# Patient Record
Sex: MALE | Race: BLACK OR AFRICAN AMERICAN | NOT HISPANIC OR LATINO | URBAN - METROPOLITAN AREA
[De-identification: names, ages, dates, MRNs, and addresses within clinical notes are randomized per-mention and may not be internally consistent; named-entity substitution may affect disease eponyms.]

---

## 2018-03-22 ENCOUNTER — OFFICE (OUTPATIENT)
Dept: URBAN - METROPOLITAN AREA CLINIC 101 | Facility: CLINIC | Age: 73
End: 2018-03-22
Payer: COMMERCIAL

## 2018-03-22 VITALS
WEIGHT: 219 LBS | HEART RATE: 67 BPM | TEMPERATURE: 97.7 F | SYSTOLIC BLOOD PRESSURE: 137 MMHG | DIASTOLIC BLOOD PRESSURE: 76 MMHG | HEIGHT: 68 IN

## 2018-03-22 DIAGNOSIS — E78.4 OTHER HYPERLIPIDEMIA: ICD-10-CM

## 2018-03-22 DIAGNOSIS — R74.8 ABNORMAL LEVELS OF OTHER SERUM ENZYMES: ICD-10-CM

## 2018-03-22 LAB
COMPREHENSIVE METABOLIC PROF: ALANINE AMINOTRANS (ALT/GPT): 38 IU/L
COMPREHENSIVE METABOLIC PROF: ALBUMIN: 4 GM/DL
COMPREHENSIVE METABOLIC PROF: ALK PHOS,TOTAL: 292 IU/L — HIGH
COMPREHENSIVE METABOLIC PROF: ANION GAP: 8
COMPREHENSIVE METABOLIC PROF: AST/SGOT ASPARTATE AMINO TRANS: 24 U/L
COMPREHENSIVE METABOLIC PROF: BILIRUBIN,TOTAL: 0.5 MG/DL
COMPREHENSIVE METABOLIC PROF: BLOOD UREA NITROGEN: 14 MG/DL
COMPREHENSIVE METABOLIC PROF: CALCIUM LEVEL: 8.6 MG/DL
COMPREHENSIVE METABOLIC PROF: CARBON DIOXIDE: 29 MEQ/L
COMPREHENSIVE METABOLIC PROF: CHLORIDE LEVEL: 104 MEQ/L
COMPREHENSIVE METABOLIC PROF: CREATININE,SERUM: 1.07 MG/DL
COMPREHENSIVE METABOLIC PROF: GLOMERULAR FILTRATION RATE: > 60 ML/MIN
COMPREHENSIVE METABOLIC PROF: GLUCOSE,RANDOM: 96 MG/DL
COMPREHENSIVE METABOLIC PROF: POTASSIUM LEVEL: 3.7 MEQ/L
COMPREHENSIVE METABOLIC PROF: SODIUM LEVEL, SERUM: 141 MEQ/L
COMPREHENSIVE METABOLIC PROF: TOTAL PROTEIN: 7.8 GM/DL
FERRITIN: 89.9 NG/ML
IRON   TIBC: CALC TOTAL IRON BINDING CAP: 375 MCG/DL
IRON   TIBC: IRON (FE): 106 MCG/DL
IRON   TIBC: PERFORMING LOCATION: (no result)
IRON   TIBC: SATURATION %: 28.2 %
IRON   TIBC: TRANSFERRIN: 268 MG/DL
PROTHROMBIN TIME  PT/ INR: INR: 1.05
PROTHROMBIN TIME  PT/ INR: PROTHROMBIN TIME (PATIENT): 11.1 SECONDS
VIT D 25HYDROXY (D2+D3): 19.54 NG/ML — LOW

## 2018-03-22 PROCEDURE — 99204 OFFICE O/P NEW MOD 45 MIN: CPT

## 2018-03-22 NOTE — SERVICEHPINOTES
BEATRICE STATON   is a   73   year old male who is being seen in consultation at the request of   JIMMIE PUTNAM   for elevated alk phos. He denies any current GI complaints. Denies abdominal pain, pruritus, fatigue. He takes 2 stool softeners daily and has a BM once daily. No blood in the stool or diarrhea. No family hx of autoimmune conditions, colon cancer, or IBD. Last colonoscopy was in 2014 by Dr Hernandez, no polyps, recall 10 years. Colonoscopy prior to that in 2009, hyperplastic polyp. No weight loss, fever, or chills.2/14/18: alk phos: 307, bilirubin 0.5 (normal), AST/ALT: 30/62, normal CBC.BR

## 2018-03-24 LAB
ACTIN (SMOOTH MUSCLE) AB - REF: 6 UNITS
ALPHA-1-ANTITRYPSIN - REF: 147 MG/DL
ANA ANTINUCLEAR AB W/RFLX- REF: ANA ANTINUCLEAR ANTIBODY RES: NEGATIVE
ANA ANTINUCLEAR AB W/RFLX- REF: PERFORMING LOCATION: (no result)
CELIAC DISEASE COMP - REF: DEAMIDATED GLIADIN, IGA - REF: 2 UNITS
CELIAC DISEASE COMP - REF: DEAMIDATED GLIADIN, IGG - REF: 2 UNITS
CELIAC DISEASE COMP - REF: ENDOMYSIAL ANTIBODY IGA - REF: NEGATIVE
CELIAC DISEASE COMP - REF: IMMUNOGLOBULIN A, QN, SERUM: 234 MG/DL
CELIAC DISEASE COMP - REF: T-TRANSGLUTAMINASE IGA - REF: <2 U/ML
CELIAC DISEASE COMP - REF: T-TRANSGLUTAMINASE IGG - REF: 2 U/ML
CERULOPLASMIN - REF: 24 MG/DL
HBSAG SCREEN - REF: HBSAG SCREEN: NEGATIVE
HEP C VIRUS AB W/RFLX VER- REF: HCV AB COMMENT: NORMAL
HEP C VIRUS AB W/RFLX VER- REF: HEP C VIRUS AB: <0.1 S/CO RATIO
MITOCHONDRIAL (M2) AB - REF: 13.7 UNITS

## 2018-06-05 ENCOUNTER — INPATIENT HOSPITAL (OUTPATIENT)
Dept: URBAN - METROPOLITAN AREA HOSPITAL 32 | Facility: HOSPITAL | Age: 73
End: 2018-06-05
Payer: MEDICARE

## 2018-06-05 DIAGNOSIS — D62 ACUTE POSTHEMORRHAGIC ANEMIA: ICD-10-CM

## 2018-06-05 DIAGNOSIS — K92.2 GASTROINTESTINAL HEMORRHAGE, UNSPECIFIED: ICD-10-CM

## 2018-06-05 DIAGNOSIS — K92.1 MELENA: ICD-10-CM

## 2018-06-05 PROCEDURE — 99291 CRITICAL CARE FIRST HOUR: CPT

## 2018-06-06 ENCOUNTER — INPATIENT HOSPITAL (OUTPATIENT)
Dept: URBAN - METROPOLITAN AREA HOSPITAL 32 | Facility: HOSPITAL | Age: 73
End: 2018-06-06
Payer: MEDICARE

## 2018-06-06 DIAGNOSIS — D62 ACUTE POSTHEMORRHAGIC ANEMIA: ICD-10-CM

## 2018-06-06 DIAGNOSIS — K92.1 MELENA: ICD-10-CM

## 2018-06-06 DIAGNOSIS — K92.2 GASTROINTESTINAL HEMORRHAGE, UNSPECIFIED: ICD-10-CM

## 2018-06-06 PROCEDURE — 99232 SBSQ HOSP IP/OBS MODERATE 35: CPT

## 2018-06-07 PROCEDURE — 99232 SBSQ HOSP IP/OBS MODERATE 35: CPT

## 2018-06-26 ENCOUNTER — OFFICE (OUTPATIENT)
Dept: URBAN - METROPOLITAN AREA CLINIC 33 | Facility: CLINIC | Age: 73
End: 2018-06-26
Payer: MEDICARE

## 2018-06-26 VITALS
DIASTOLIC BLOOD PRESSURE: 78 MMHG | HEIGHT: 68 IN | HEART RATE: 71 BPM | TEMPERATURE: 97.5 F | WEIGHT: 218 LBS | SYSTOLIC BLOOD PRESSURE: 133 MMHG

## 2018-06-26 DIAGNOSIS — Z86.010 PERSONAL HISTORY OF COLONIC POLYPS: ICD-10-CM

## 2018-06-26 DIAGNOSIS — K92.2 GASTROINTESTINAL HEMORRHAGE, UNSPECIFIED: ICD-10-CM

## 2018-06-26 DIAGNOSIS — K57.30 DIVERTICULOSIS OF LARGE INTESTINE WITHOUT PERFORATION OR ABS: ICD-10-CM

## 2018-06-26 DIAGNOSIS — R93.5 ABNORMAL FINDINGS ON DIAGNOSTIC IMAGING OF OTHER ABDOMINAL R: ICD-10-CM

## 2018-06-26 PROCEDURE — 99214 OFFICE O/P EST MOD 30 MIN: CPT

## 2018-06-26 NOTE — SERVICEHPINOTES
Mr. Mckinney is a 73 yr old male here for follow up to recent hospitalization for a GI bleed. He presented to the ER on 6/5/18 for hematochezia. While in the ER, he passed a large, bloody stool and became bradycardiac, hypotensive and had a syncopal episode. He was hemodynamically unstable and was admitted to the ICU. His hemoglobin dropped to 10.8 and he was given 4 units of PRBCS. He subsequently had an IR guided embolization.   A CT scan during hospitalization showed focal wall thickening with mild irregularity involving the mid descending colon malignancy not excluded recommend colonoscopy. Since being d/c from the hospital, he is doing well. He notes regular, brown bowel movements and no further bleeding. He just had a CBC from his PCP at the end of last week and was told everything was "fine". No abdominal pain. He had a colonoscopy in 2009 by Dr. Guevara which showed a hyperplastic polyp. Colonoscopy in 2014 by Dr. Hernandez showed diverticulosis but was otherwise unremarkable. He takes a baby ASA but no other NSAID use. No family hx of GI illness. Loy known heart issues.

## 2018-08-15 ENCOUNTER — ON CAMPUS - OUTPATIENT (OUTPATIENT)
Dept: URBAN - METROPOLITAN AREA HOSPITAL 35 | Facility: HOSPITAL | Age: 73
End: 2018-08-15
Payer: COMMERCIAL

## 2018-08-15 DIAGNOSIS — R93.5 ABNORMAL FINDINGS ON DIAGNOSTIC IMAGING OF OTHER ABDOMINAL R: ICD-10-CM

## 2018-08-15 DIAGNOSIS — D12.0 BENIGN NEOPLASM OF CECUM: ICD-10-CM

## 2018-08-15 DIAGNOSIS — K92.2 GASTROINTESTINAL HEMORRHAGE, UNSPECIFIED: ICD-10-CM

## 2018-08-15 PROCEDURE — 45380 COLONOSCOPY AND BIOPSY: CPT

## 2022-04-18 ENCOUNTER — INPATIENT HOSPITAL (OUTPATIENT)
Dept: URBAN - METROPOLITAN AREA HOSPITAL 34 | Facility: HOSPITAL | Age: 77
End: 2022-04-18
Payer: COMMERCIAL

## 2022-04-18 DIAGNOSIS — K92.2 GASTROINTESTINAL HEMORRHAGE, UNSPECIFIED: ICD-10-CM

## 2022-04-18 DIAGNOSIS — Z79.01 LONG TERM (CURRENT) USE OF ANTICOAGULANTS: ICD-10-CM

## 2022-04-18 DIAGNOSIS — K92.1 MELENA: ICD-10-CM

## 2022-04-18 PROCEDURE — 99222 1ST HOSP IP/OBS MODERATE 55: CPT | Performed by: INTERNAL MEDICINE

## 2022-04-19 ENCOUNTER — INPATIENT HOSPITAL (OUTPATIENT)
Dept: URBAN - METROPOLITAN AREA HOSPITAL 34 | Facility: HOSPITAL | Age: 77
End: 2022-04-19
Payer: COMMERCIAL

## 2022-04-19 DIAGNOSIS — K92.2 GASTROINTESTINAL HEMORRHAGE, UNSPECIFIED: ICD-10-CM

## 2022-04-19 DIAGNOSIS — K92.1 MELENA: ICD-10-CM

## 2022-04-19 DIAGNOSIS — Z79.01 LONG TERM (CURRENT) USE OF ANTICOAGULANTS: ICD-10-CM

## 2022-04-19 PROCEDURE — 99232 SBSQ HOSP IP/OBS MODERATE 35: CPT | Performed by: NURSE PRACTITIONER

## 2022-04-20 ENCOUNTER — OFFICE (OUTPATIENT)
Dept: URBAN - METROPOLITAN AREA CLINIC 102 | Facility: CLINIC | Age: 77
End: 2022-04-20

## 2022-04-20 ENCOUNTER — OFFICE (OUTPATIENT)
Dept: URBAN - METROPOLITAN AREA CLINIC 102 | Facility: CLINIC | Age: 77
End: 2022-04-20
Payer: COMMERCIAL

## 2022-04-20 VITALS
SYSTOLIC BLOOD PRESSURE: 147 MMHG | HEART RATE: 94 BPM | HEIGHT: 68 IN | WEIGHT: 214 LBS | TEMPERATURE: 97.3 F | DIASTOLIC BLOOD PRESSURE: 90 MMHG

## 2022-04-20 DIAGNOSIS — K92.2 GASTROINTESTINAL HEMORRHAGE, UNSPECIFIED: ICD-10-CM

## 2022-04-20 DIAGNOSIS — I48.0 PAROXYSMAL ATRIAL FIBRILLATION: ICD-10-CM

## 2022-04-20 PROCEDURE — 99214 OFFICE O/P EST MOD 30 MIN: CPT

## 2022-04-20 PROCEDURE — 00031: CPT | Performed by: INTERNAL MEDICINE

## 2022-04-20 NOTE — SERVICEHPINOTES
BEATRICE STATON   is a   77   male who presents for hospital follow up for GI bleed. He went to hospital on 4/18 for rectal bleeding. He has a h/o diverticular bleed in 2018 requiring IR embolization and ICU care. Because of this, he went to ER on 4/18 and had several episodes of blood clots and red blood in stool while in hospital. Was d/c yesterday.  He denies any issues since d/c. He did not require any transfusions this stay. He had a BM yesterday at home without any blood. BMs typically 2x/day, has not gone yet this AM.
br
He was diagnosed with a fib less than a year ago and was started on eliquis. Stopped eliquis while in hospital. He has f/u with cardiologist (Dr Vela) next week. No abdominal pain. Does not take ASA. States he does typically take ibuprofen 3-4x/week, 400mg once or twice daily as needed for arthritic complaints. Denies any other cardiac issues aside from a fib. No prior CVA or MI. Last colonoscopy was 4/2018 which revealed extensive mixed diverticulosis throughout the colon, adenomatous polyp.
br
br Hgb was 13.5 yesterday prior to d/c.

## 2022-06-08 ENCOUNTER — ON CAMPUS - OUTPATIENT (OUTPATIENT)
Dept: URBAN - METROPOLITAN AREA HOSPITAL 16 | Facility: HOSPITAL | Age: 77
End: 2022-06-08
Payer: COMMERCIAL

## 2022-06-08 DIAGNOSIS — K92.2 GASTROINTESTINAL HEMORRHAGE, UNSPECIFIED: ICD-10-CM

## 2022-06-08 PROCEDURE — 45378 DIAGNOSTIC COLONOSCOPY: CPT | Performed by: INTERNAL MEDICINE

## 2022-07-19 PROBLEM — Z12.11 SCREENING COLON: Status: ACTIVE | Noted: 2022-07-19

## 2022-08-15 ENCOUNTER — OFFICE (OUTPATIENT)
Dept: URBAN - METROPOLITAN AREA CLINIC 102 | Facility: CLINIC | Age: 77
End: 2022-08-15

## 2022-08-15 PROCEDURE — 00031: CPT | Performed by: INTERNAL MEDICINE

## 2022-09-28 ENCOUNTER — ON CAMPUS - OUTPATIENT (OUTPATIENT)
Dept: URBAN - METROPOLITAN AREA HOSPITAL 16 | Facility: HOSPITAL | Age: 77
End: 2022-09-28
Payer: COMMERCIAL

## 2022-09-28 DIAGNOSIS — Z12.11 ENCOUNTER FOR SCREENING FOR MALIGNANT NEOPLASM OF COLON: ICD-10-CM

## 2022-09-28 DIAGNOSIS — D12.0 BENIGN NEOPLASM OF CECUM: ICD-10-CM

## 2022-09-28 DIAGNOSIS — K63.5 POLYP OF COLON: ICD-10-CM

## 2022-09-28 PROCEDURE — 45380 COLONOSCOPY AND BIOPSY: CPT | Mod: PT | Performed by: INTERNAL MEDICINE
